# Patient Record
Sex: MALE | Race: WHITE
[De-identification: names, ages, dates, MRNs, and addresses within clinical notes are randomized per-mention and may not be internally consistent; named-entity substitution may affect disease eponyms.]

---
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
## 2023-03-31 NOTE — ER DOCUMENT REPORT
ED General





- General


Chief Complaint: Psych Problem


Stated Complaint: PSYCH EVAL


Time Seen by Provider: 09/21/18 17:21


Mode of Arrival: Ambulatory


Cannot obtain history due to: Mentally challenged


Notes: 





Patient is a 13-year-old male with autism spectrum disorder who presents with 

agitated behaviors, threatening suicide and threatening to kill his parents.  

He has been off medications for approximately 1 week due to insurance issues.  

Family states that they do not feel safe currently at home with the patient.  

He has a history of prior hospitalizations due to psychiatric decompensation.  

No additional history can be obtained for the patient as he does have 

significant developmental delay.


TRAVEL OUTSIDE OF THE [place] IN LAST 30 DAYS: No





- Related Data


Allergies/Adverse Reactions: 


 





No Known Allergies Allergy (Verified 09/21/18 17:06)


 











Past Medical History





- General


Information source: Parent





- Social History


Smoking Status: Never Smoker


Frequency of alcohol use: None


Drug Abuse: None


Lives with: Family


Family History: Reviewed & Not Pertinent


Patient has suicidal ideation: Yes


Patient has homicidal ideation: Yes


Renal/ Medical History: Denies: Hx Peritoneal Dialysis


Psychiatric Medical History: Reports: Hx Attention Deficit Hyperactivity 

Disorder





- Immunizations


Immunizations up to date: Yes





Review of Systems





- Review of Systems


Notes: 





Constitutional: Negative for fever.


HENT: Negative for sore throat.


Eyes: Negative for visual changes.


Cardiovascular: Negative for chest pain.


Respiratory: Negative for shortness of breath.


Gastrointestinal: Negative for abdominal pain, vomiting or diarrhea.


Genitourinary: Negative for dysuria.


Musculoskeletal: Negative for back pain.


Skin: Negative for rash.


Neurological: Negative for headaches, weakness or numbness.





10 point ROS negative except as marked above and in HPI.





Physical Exam





- Vital signs


Vitals: 





 











Temp Pulse Resp BP Pulse Ox


 


 98.1 F   127 H  21 H  131/92 H  100 


 


 09/21/18 17:11  09/21/18 17:11  09/21/18 17:11  09/21/18 17:11  09/21/18 17:11











Interpretation: Tachycardic


Notes: 





PHYSICAL EXAMINATION:





GENERAL: Well-appearing, well-nourished and in no acute distress.





HEAD: Atraumatic, normocephalic.





EYES: Pupils equal round and reactive to light, extraocular movements intact, 

sclera anicteric, conjunctiva are normal.





ENT: nares patent, oropharynx clear without exudates.  Moist mucous membranes.





NECK: Normal range of motion, supple without lymphadenopathy





LUNGS: Breath sounds clear to auscultation bilaterally and equal.  No wheezes 

rales or rhonchi.





HEART: Regular rate and rhythm without murmurs





ABDOMEN: Soft, nontender, normoactive bowel sounds.  No guarding, no rebound.  

No masses appreciated.





EXTREMITIES: Normal range of motion, no pitting or edema.  No cyanosis.





NEUROLOGICAL: No focal neurological deficits. Moves all extremities 

spontaneously and on command.





PSYCH: Alert, apparent cognitive delay, no aggressive behaviors exhibited





SKIN: Warm, Dry, normal turgor, no rashes or lesions noted.





Course





- Re-evaluation


Re-evalutation: 





09/21/18 20:08


Patient presents with aggressive behaviors at home, currently off medications 

for baseline autism spectrum disorder.  No acute medical concerns.  Medical 

screening exam and labs unremarkable.  He is cleared for evaluation and 

disposition by behavioral health in the morning.





- Vital Signs


Vital signs: 





 











Temp Pulse Resp BP Pulse Ox


 


 98.1 F   127 H  21 H  131/92 H  100 


 


 09/21/18 17:11  09/21/18 17:11  09/21/18 17:11  09/21/18 17:11  09/21/18 17:11














- Laboratory


Result Diagrams: 


 09/21/18 18:00





 09/21/18 18:00


Laboratory results interpreted by me: 





 











  09/21/18 09/21/18





  18:00 18:00


 


Monocytes %  13.5 H 


 


ALT   52 H


 


Alkaline Phosphatase   101 L


 


Salicylates   < 1.0 L


 


Acetaminophen   < 10 L














Discharge





- Discharge


Clinical Impression: 


 Suicidal ideation, Autism spectrum disorder





Condition: Fair


Referrals: 


CARLA ROY MD [Primary Care Provider] - Follow up as needed Name band;